# Patient Record
Sex: FEMALE | Race: WHITE
[De-identification: names, ages, dates, MRNs, and addresses within clinical notes are randomized per-mention and may not be internally consistent; named-entity substitution may affect disease eponyms.]

---

## 2018-01-06 ENCOUNTER — HOSPITAL ENCOUNTER (OUTPATIENT)
Dept: HOSPITAL 45 - C.EDB | Age: 78
Setting detail: OBSERVATION
LOS: 1 days | Discharge: HOME | End: 2018-01-07
Attending: HOSPITALIST | Admitting: HOSPITALIST
Payer: COMMERCIAL

## 2018-01-06 VITALS
TEMPERATURE: 98.6 F | OXYGEN SATURATION: 93 % | SYSTOLIC BLOOD PRESSURE: 91 MMHG | DIASTOLIC BLOOD PRESSURE: 42 MMHG | HEART RATE: 82 BPM

## 2018-01-06 VITALS
SYSTOLIC BLOOD PRESSURE: 122 MMHG | HEART RATE: 77 BPM | DIASTOLIC BLOOD PRESSURE: 76 MMHG | OXYGEN SATURATION: 97 % | TEMPERATURE: 98.24 F

## 2018-01-06 VITALS
HEIGHT: 66 IN | HEIGHT: 66 IN | BODY MASS INDEX: 23.28 KG/M2 | WEIGHT: 144.84 LBS | BODY MASS INDEX: 23.28 KG/M2 | WEIGHT: 144.84 LBS

## 2018-01-06 VITALS — SYSTOLIC BLOOD PRESSURE: 91 MMHG | DIASTOLIC BLOOD PRESSURE: 42 MMHG | TEMPERATURE: 98.6 F

## 2018-01-06 VITALS — OXYGEN SATURATION: 97 %

## 2018-01-06 DIAGNOSIS — Z90.49: ICD-10-CM

## 2018-01-06 DIAGNOSIS — E78.5: ICD-10-CM

## 2018-01-06 DIAGNOSIS — Z82.49: ICD-10-CM

## 2018-01-06 DIAGNOSIS — F03.90: ICD-10-CM

## 2018-01-06 DIAGNOSIS — Z94.7: ICD-10-CM

## 2018-01-06 DIAGNOSIS — Z83.6: ICD-10-CM

## 2018-01-06 DIAGNOSIS — Z83.3: ICD-10-CM

## 2018-01-06 DIAGNOSIS — G93.40: Primary | ICD-10-CM

## 2018-01-06 LAB
ALBUMIN SERPL-MCNC: 3.8 GM/DL (ref 3.4–5)
ALP SERPL-CCNC: 80 U/L (ref 45–117)
ALT SERPL-CCNC: 26 U/L (ref 12–78)
AST SERPL-CCNC: 23 U/L (ref 15–37)
BASOPHILS # BLD: 0.02 K/UL (ref 0–0.2)
BASOPHILS NFR BLD: 0.2 %
BUN SERPL-MCNC: 19 MG/DL (ref 7–18)
CALCIUM SERPL-MCNC: 8.5 MG/DL (ref 8.5–10.1)
CO2 SERPL-SCNC: 27 MMOL/L (ref 21–32)
CREAT SERPL-MCNC: 0.93 MG/DL (ref 0.6–1.2)
EOS ABS #: 0.04 K/UL (ref 0–0.5)
EOSINOPHIL NFR BLD AUTO: 190 K/UL (ref 130–400)
GLUCOSE SERPL-MCNC: 115 MG/DL (ref 70–99)
HCT VFR BLD CALC: 44.1 % (ref 37–47)
HGB BLD-MCNC: 15.3 G/DL (ref 12–16)
IG#: 0.03 K/UL (ref 0–0.02)
IMM GRANULOCYTES NFR BLD AUTO: 13.3 %
INR PPP: 1 (ref 0.9–1.1)
LIPASE: 193 U/L (ref 73–393)
LYMPHOCYTES # BLD: 1.09 K/UL (ref 1.2–3.4)
MCH RBC QN AUTO: 31.9 PG (ref 25–34)
MCHC RBC AUTO-ENTMCNC: 34.7 G/DL (ref 32–36)
MCV RBC AUTO: 91.9 FL (ref 80–100)
MONO ABS #: 0.37 K/UL (ref 0.11–0.59)
MONOCYTES NFR BLD: 4.5 %
NEUT ABS #: 6.66 K/UL (ref 1.4–6.5)
NEUTROPHILS # BLD AUTO: 0.5 %
NEUTROPHILS NFR BLD AUTO: 81.1 %
PMV BLD AUTO: 9.9 FL (ref 7.4–10.4)
POTASSIUM SERPL-SCNC: 4.1 MMOL/L (ref 3.5–5.1)
PROT SERPL-MCNC: 7.6 GM/DL (ref 6.4–8.2)
PTT PATIENT: 24.8 SECONDS (ref 21–31)
RED CELL DISTRIBUTION WIDTH CV: 12.4 % (ref 11.5–14.5)
RED CELL DISTRIBUTION WIDTH SD: 41.8 FL (ref 36.4–46.3)
SODIUM SERPL-SCNC: 138 MMOL/L (ref 136–145)
WBC # BLD AUTO: 8.21 K/UL (ref 4.8–10.8)

## 2018-01-06 RX ADMIN — PREDNISOLONE ACETATE SCH DROPS: 10 SUSPENSION/ DROPS OPHTHALMIC at 21:00

## 2018-01-06 NOTE — DIAGNOSTIC IMAGING REPORT
CHEST ONE VIEW PORTABLE



CLINICAL HISTORY: Weakness    



COMPARISON STUDY:  No previous studies for comparison.



FINDINGS: The cardiac and mediastinal contours are normal. There is no evidence

of focal pulmonary consolidation. There is no evidence of failure. No pleural

effusions are visualized.[ 

There is mild biapical pleural thickening





IMPRESSION: No active disease in the chest.







Electronically signed by:  Nikunj Townsend M.D.

1/6/2018 5:22 PM



Dictated Date/Time:  1/6/2018 5:22 PM

## 2018-01-06 NOTE — DIAGNOSTIC IMAGING REPORT
CT HEAD WITHOUT CONTRAST (CT)



CLINICAL HISTORY: Weakness, confusion. Possible stroke.    



COMPARISON STUDY:  No previous studies for comparison.



TECHNIQUE:  Axial CT of the brain is performed from the vertex to the skull

base. IV contrast was not administered for this examination. A dose lowering

technique was utilized adhering to the principles of ALARA.

 



CT DOSE: 623.48 mGy.cm



FINDINGS:



No intra or extra-axial mass lesions are visualized. There is no CT evidence of

acute cortical infarction. There is no evidence of midline shift. There is no

acute  hemorrhage. No calvarial fractures are visualized. 

There are patchy white matter hypodensities likely on a small vessel basis.

There is no evidence of pathologic ventricular dilatation.

There is a small right maxillary sinus air-fluid level.



IMPRESSION: 

1. No acute intracranial findings

2. Small right maxillary sinus air-fluid level. Correlate clinically in regards

to sinusitis







Electronically signed by:  Nikunj Townsend M.D.

1/6/2018 4:40 PM



Dictated Date/Time:  1/6/2018 4:39 PM

## 2018-01-06 NOTE — DIAGNOSTIC IMAGING REPORT
MR ANGIOGRAPHY OF THE [place] NO CONTRAST



CLINICAL HISTORY: Weakness. Suspected acute stroke. Confusion. Memory loss.    



COMPARISON STUDY: None.



A 3-D time-of-flight MR angiographic sequence of the [place] was

performed. Both the source and projection images were reviewed.



There is no evidence of major intracranial branch occlusion. There is no

evidence of intracranial stenosis. There are no lesions suspicious for aneurysm.



IMPRESSION: Unremarkable MR angiography of the [place].







Electronically signed by:  Nikunj Townsend M.D.

1/6/2018 9:26 PM



Dictated Date/Time:  1/6/2018 9:22 PM

## 2018-01-06 NOTE — DIAGNOSTIC IMAGING REPORT
NECK MRA



HISTORY:      Stroke



TECHNIQUE: Time-of-flight and gadolinium-enhanced MRA of the neck was performed

both before and after the intravenous administration of contrast (6.5 cc

intravenous Gadavist. All measurements were calculated based on NASCET criteria.



COMPARISON STUDY:  None.



FINDINGS: The aortic arch and proximal great vessels are widely patent.  There

is no significant stenosis, occlusion, or dissection identified within the

bilateral common carotid, internal carotid, or vertebral arteries.    



IMPRESSION:  

No significant stenosis, occlusion, or dissection identified within the carotid

or vertebral arteries. 







Electronically signed by:  Nikunj Townsend M.D.

1/6/2018 9:52 PM



Dictated Date/Time:  1/6/2018 9:50 PM

## 2018-01-06 NOTE — HISTORY AND PHYSICAL
History & Physical


Date & Time of Service:


2018 at 19:41


Chief Complaint:


Poss. Stroke


Primary Care Physician:


Óscar Serna MD


History of Present Illness


Source:  patient


78 y/o F with history of recently diagnosed hyperlipidemia and no additional 

active medical issues.  The pt was apparently driving to her hair salon when 

she became acutely confused.  She phoned the salon saying that she was going to 

be late as she could not find parking despite the parking lot being empty.  She 

then told her  that she did not know where her car was.  The hair 

dresser, no doubt an astute individual, recognized that she was not acting like 

herself, and informed her family.  She was picked up by her daughter and 

eventually brought to the ER.  The pt is awake and alert but does display 

memory impairment which her family states is an acute change from her baseline.

  She does not know the date, does not know who the president is and could not 

recall any items on 3 item recall.





Past Medical/Surgical History





Recent diagnosis of elevated cholesterol - has not been treated





Surgical Problems:


Appendectomy





Cataract surgery 





Tonsillectomy





Corneal transplant











Family History





Diabetes mellitus


Hypertension


Lung disease


Mother  owing to COPD - no history of CVA, CAD or dementia reported





Social History


Pt is normally fully independent


Smoking Status:  Never Smoker


Marital Status:  


Occupational Status:  retired





Allergies


Coded Allergies:  


     No Known Allergies (Unverified , 18)





Home Medications


Scheduled


Polymyxin/Trimethoprim Oph (Polytrim Oph), 1 DROP OPR EVERY 1 HOUR


Prednisolone Acetate 1% Oph (Pred Forte 1% Oph), 1 DROP BID





Review of Systems


Constitutional:  No fever, No chills, No sweats


Eyes:  No worsening of vision


ENT:  No hearing loss, No nasal symptoms


Respiratory:  No cough, No wheezing


Cardiovascular:  No chest pain, No orthopnea, No PND


Abdomen:  No pain, No vomiting


Musculoskeletal:  No joint pain


Genitourinary - Female:  No dysuria, No urinary frequency, No urinary urgency


Neurologic:  + memory loss, No weakness


Psychiatric:  No depression symptoms


Endocrine:  No fatigue


Hematologic / Lymphatic:  No abnormal bleeding/bruising


Integumentary:  No rash


Allergic / Immunologic:  No environmental allergies





Physical Exam


Vital Signs











  Date Time  Temp Pulse Resp B/P (MAP) Pulse Ox O2 Delivery O2 Flow Rate FiO2


 


18 18:02  83 16 125/69 94 Room Air  


 


18 16:50  89      


 


18 16:41 36.7 83 16 133/76  Room Air  


 


18 16:39     96 Room Air  








General Appearance:  + pertinent finding (PLeasant, elderly female in no 

distress)


Head:  normocephalic


Eyes:  normal inspection


ENT:  normal ENT inspection, pharynx normal


Neck:  supple, no JVD


Respiratory/Chest:  chest non-tender, lungs clear, normal breath sounds


Cardiovascular:  regular rate, rhythm, no edema, no gallop


Abdomen/GI:  normal bowel sounds, non tender, soft


Back:  normal inspection, no CVA tenderness


Extremities/Musculoskelatal:  normal inspection, no calf tenderness, normal 

capillary refill


Neurologic/Psych:  CNs II-XII nml as tested


Skin:  + pertinent finding (There are no motor or sensory deficits - 

coordination is fully intact - she is unable to recall the date or who the 

president is - she cannot recall any items on 3 item recall - she is orientd x 

2 despite this)





Diagnostics


Laboratory Results





Results Past 24 Hours








Test


  18


16:06 18


17:27 Range/Units


 


 


White Blood Count 8.21  4.8-10.8  K/uL


 


Red Blood Count 4.80  4.2-5.4  M/uL


 


Hemoglobin 15.3  12.0-16.0  g/dL


 


Hematocrit 44.1  37-47  %


 


Mean Corpuscular Volume 91.9    fL


 


Mean Corpuscular Hemoglobin 31.9  25-34  pg


 


Mean Corpuscular Hemoglobin


Concent 34.7


  


  32-36  g/dl


 


 


Platelet Count 190  130-400  K/uL


 


Mean Platelet Volume 9.9  7.4-10.4  fL


 


Neutrophils (%) (Auto) 81.1   %


 


Lymphocytes (%) (Auto) 13.3   %


 


Monocytes (%) (Auto) 4.5   %


 


Eosinophils (%) (Auto) 0.5   %


 


Basophils (%) (Auto) 0.2   %


 


Neutrophils # (Auto) 6.66  1.4-6.5  K/uL


 


Lymphocytes # (Auto) 1.09  1.2-3.4  K/uL


 


Monocytes # (Auto) 0.37  0.11-0.59  K/uL


 


Eosinophils # (Auto) 0.04  0-0.5  K/uL


 


Basophils # (Auto) 0.02  0-0.2  K/uL


 


RDW Standard Deviation 41.8  36.4-46.3  fL


 


RDW Coefficient of Variation 12.4  11.5-14.5  %


 


Immature Granulocyte % (Auto) 0.4   %


 


Immature Granulocyte # (Auto) 0.03  0.00-0.02  K/uL


 


Prothrombin Time


  10.7


  


  9.0-12.0


SECONDS


 


Prothromb Time International


Ratio 1.0


  


  0.9-1.1  


 


 


Activated Partial


Thromboplast Time 24.8


  


  21.0-31.0


SECONDS


 


Partial Thromboplastin Ratio 1.0   


 


Sodium Level 138  136-145  mmol/L


 


Potassium Level 4.1  3.5-5.1  mmol/L


 


Chloride Level 103    mmol/L


 


Carbon Dioxide Level 27  21-32  mmol/L


 


Anion Gap 8.0  3-11  mmol/L


 


Blood Urea Nitrogen 19  7-18  mg/dl


 


Creatinine


  0.93


  


  0.60-1.20


mg/dl


 


Est Creatinine Clear Calc


Drug Dose 48.1


  


   ml/min


 


 


Estimated GFR (


American) 69.2


  


   


 


 


Estimated GFR (Non-


American 59.7


  


   


 


 


BUN/Creatinine Ratio 20.0  10-20  


 


Random Glucose 115  70-99  mg/dl


 


Calcium Level 8.5  8.5-10.1  mg/dl


 


Magnesium Level 2.1  1.8-2.4  mg/dl


 


Total Bilirubin 0.5  0.2-1  mg/dl


 


Direct Bilirubin < 0.1  0-0.2  mg/dl


 


Aspartate Amino Transf


(AST/SGOT) 23


  


  15-37  U/L


 


 


Alanine Aminotransferase


(ALT/SGPT) 26


  


  12-78  U/L


 


 


Alkaline Phosphatase 80    U/L


 


Troponin I < 0.015  0-0.045  ng/ml


 


Pro-B-Type Natriuretic Peptide 140  0-1800  pg/ml


 


Total Protein 7.6  6.4-8.2  gm/dl


 


Albumin 3.8  3.4-5.0  gm/dl


 


Lipase 193    U/L


 


Thyroid Stimulating Hormone


(TSH) 1.060


  


  0.300-4.500


uIu/ml


 


Urine Color  YELLOW  


 


Urine Appearance  CLEAR CLEAR  


 


Urine pH  7.0 4.5-7.5  


 


Urine Specific Gravity  1.013 1.000-1.030  


 


Urine Protein  NEG NEG  


 


Urine Glucose (UA)  NEG NEG  


 


Urine Ketones  NEG NEG  


 


Urine Occult Blood  NEG NEG  


 


Urine Nitrite  NEG NEG  


 


Urine Bilirubin  NEG NEG  


 


Urine Urobilinogen  NEG NEG  


 


Urine Leukocyte Esterase  NEG NEG  








Microbiology Results


18 Urine Culture, Received


         Pending





Diagnostic Radiology


CT head


1. No acute intracranial findings


2. Small right maxillary sinus air-fluid level. Correlate clinically in regards 

to sinusitis


Normal EKG





Impression


Assessment and Plan


78 y/o F with history of recently diagnosed hyperlipidemia and no additional 

active medical issues.  The pt was apparently driving to her hair salon when 

she became acutely confused.  She phoned the salon saying that she was going to 

be late as she could not find parking despite the parking lot being empty.  She 

then told her  that she did not know where her car was.  The hair 

dresser, no doubt an astute individual, recognized that she was not acting like 

herself, and informed her family.  She was picked up by her daughter and 

eventually brought to the ER.  The pt is awake and alert but does display 

memory impairment which her family states is an acute change from her baseline.

  She does not know the date, does not know who the president is and could not 

recall any items on 3 item recall.





The pt has persistent memory impairment - the exam would be consistent with 

dementia, however, the family are adamant that her memory loss is acute.  She 

is admitted with a TIA protocol.  An MRI/MRA will be obtained and we will 

consult neurology.  She will be placed on ASA and a statin.








Full code - Heparin prophylaxis 


Total time for this admit including review of labs, meds, imaging - discussion 

with pt, family and ER attending - 37 min





Level of Care


Telemetry





Resuscitation Status


FULL RESUSCITATION





VTE Prophylaxis


VTE Risk Assessment Done? Y/N:  Yes


Risk Level:  Low


Given or contraindicated:  Unfractionated heparin SQ

## 2018-01-06 NOTE — EMERGENCY ROOM VISIT NOTE
History


Report prepared by Maryjane:  Jackson Flores


Under the Supervision of:  Dr. Hi Trujillo M.D.


First contact with patient:  16:21


Stated Complaint:  POSS. STROKE





History of Present Illness


The patient is a 77 year old white female with a past medical history of right 

corneal transplant and cataracts bilaterally who presents to the ED brought in 

by EMS with episodic altered mental status 45 minutes PTA. Positive 

forgetfulness and leg cramps. Negative chest pain, shortness of breath, or 

urinary symptoms. The patient states that she was at the hair salon when she 

experienced forgetfulness. Per family, she called the [employer] to inform them 

that she was late due to lack of parking and she parked half a block away; 

however, there the parking lot was empty. Per family, the patient is normally 

coherent. Per EMS, the [place] stroke evaluation was negative. Per EMS, the 

patient's blood sugar was normal and her vital signs were normal. Per EMS, the 

patient was protecting her airway. Per EMS, the patient did not have obvious 

focal neurological deficits. She denies any other underlying medical problems. 

She denies any history of tobacco or alcohol use.





   Source of History:  patient, family, EMS


   Onset:  45 minutes PTA


   Position:  other (global )


   Quality:  other (altered mental status)


   Timing:  other (episodic)


   Associated Symptoms:  No chest pain, No SOB, No urinary symptoms


Note:


She notes forgetfulness and leg cramps.





Review of Systems


See HPI for pertinent positives and negatives.  A total of ten systems were 

reviewed and were otherwise negative.





Past Medical & Surgical


Surgical Problems:


(1) Hx of appendectomy


(2) Hx of cataract surgery


(3) Hx of tonsillectomy








Family History





Diabetes mellitus


Hypertension


Lung disease





Social History


Smoking Status:  Never Smoker


Alcohol Use:  none


Marital Status:  


Housing Status:  lives with family


Occupation Status:  retired





Current/Historical Medications


Scheduled


Polymyxin/Trimethoprim Oph (Polytrim Oph), 1 DROP OPR EVERY 1 HOUR


Prednisolone Acetate 1% Oph (Pred Forte 1% Oph), 1 DROP BID





Allergies


Coded Allergies:  


     No Known Allergies (Unverified , 1/6/18)





Physical Exam


Vital Signs











  Date Time  Temp Pulse Resp B/P (MAP) Pulse Ox O2 Delivery O2 Flow Rate FiO2


 


1/6/18 20:02 36.7 81 16 137/81 97   


 


1/6/18 19:38  81 16 137/81 97 Room Air  


 


1/6/18 18:02  83 16 125/69 94 Room Air  


 


1/6/18 16:50  89      


 


1/6/18 16:41 36.7 83 16 133/76  Room Air  


 


1/6/18 16:39     96 Room Air  











Physical Exam


GENERAL: Awake, alert, well-appearing, NAD


HENT: Normocephalic, atraumatic.


EYES: Normal conjunctiva. Sclera non-icteric.


NECK: Supple. No nuchal rigidity. FROM.


RESPIRATORY: CTAB, no rhonchi, wheezing, crackles


CARDIAC: RRR, no MRG


ABDOMEN: Soft, NTND, BS+


MSK: No chest wall TTP, no LE edema


NEURO: CN 2-12 intact, 5/5 upper and lower extremity strength, no dysmetria, no 

drift, good finger to nose, no sensory deficits. 


SKIN: No rash or jaundice noted.





Medical Decision & Procedures


ER Provider


Diagnostic Interpretation:


Radiology results as stated below per my review and radiologist interpretation: 








CT HEAD WITHOUT CONTRAST (CT)





CLINICAL HISTORY: Weakness, confusion. Possible stroke.    





COMPARISON STUDY:  No previous studies for comparison.





TECHNIQUE:  Axial CT of the brain is performed from the vertex to the skull


base. IV contrast was not administered for this examination. A dose lowering


technique was utilized adhering to the principles of ALARA.


 





CT DOSE: 623.48 mGy.cm





FINDINGS:





No intra or extra-axial mass lesions are visualized. There is no CT evidence of


acute cortical infarction. There is no evidence of midline shift. There is no


acute  hemorrhage. No calvarial fractures are visualized. 


There are patchy white matter hypodensities likely on a small vessel basis.


There is no evidence of pathologic ventricular dilatation.


There is a small right maxillary sinus air-fluid level.





IMPRESSION: 


1. No acute intracranial findings


2. Small right maxillary sinus air-fluid level. Correlate clinically in regards


to sinusitis











Electronically signed by:  Nikunj Townsend M.D.


1/6/2018 4:40 PM





Dictated Date/Time:  1/6/2018 4:39 PM





Laboratory Results


1/6/18 16:06








Red Blood Count 4.80, Mean Corpuscular Volume 91.9, Mean Corpuscular Hemoglobin 

31.9, Mean Corpuscular Hemoglobin Concent 34.7, Mean Platelet Volume 9.9, 

Neutrophils (%) (Auto) 81.1, Lymphocytes (%) (Auto) 13.3, Monocytes (%) (Auto) 

4.5, Eosinophils (%) (Auto) 0.5, Basophils (%) (Auto) 0.2, Neutrophils # (Auto) 

6.66, Lymphocytes # (Auto) 1.09, Monocytes # (Auto) 0.37, Eosinophils # (Auto) 

0.04, Basophils # (Auto) 0.02





1/6/18 16:06

















Test


  1/6/18


16:06 1/6/18


17:27


 


White Blood Count


  8.21 K/uL


(4.8-10.8) 


 


 


Red Blood Count


  4.80 M/uL


(4.2-5.4) 


 


 


Hemoglobin


  15.3 g/dL


(12.0-16.0) 


 


 


Hematocrit 44.1 % (37-47)  


 


Mean Corpuscular Volume


  91.9 fL


() 


 


 


Mean Corpuscular Hemoglobin


  31.9 pg


(25-34) 


 


 


Mean Corpuscular Hemoglobin


Concent 34.7 g/dl


(32-36) 


 


 


Platelet Count


  190 K/uL


(130-400) 


 


 


Mean Platelet Volume


  9.9 fL


(7.4-10.4) 


 


 


Neutrophils (%) (Auto) 81.1 %  


 


Lymphocytes (%) (Auto) 13.3 %  


 


Monocytes (%) (Auto) 4.5 %  


 


Eosinophils (%) (Auto) 0.5 %  


 


Basophils (%) (Auto) 0.2 %  


 


Neutrophils # (Auto)


  6.66 K/uL


(1.4-6.5) 


 


 


Lymphocytes # (Auto)


  1.09 K/uL


(1.2-3.4) 


 


 


Monocytes # (Auto)


  0.37 K/uL


(0.11-0.59) 


 


 


Eosinophils # (Auto)


  0.04 K/uL


(0-0.5) 


 


 


Basophils # (Auto)


  0.02 K/uL


(0-0.2) 


 


 


RDW Standard Deviation


  41.8 fL


(36.4-46.3) 


 


 


RDW Coefficient of Variation


  12.4 %


(11.5-14.5) 


 


 


Immature Granulocyte % (Auto) 0.4 %  


 


Immature Granulocyte # (Auto)


  0.03 K/uL


(0.00-0.02) 


 


 


Prothrombin Time


  10.7 SECONDS


(9.0-12.0) 


 


 


Prothromb Time International


Ratio 1.0 (0.9-1.1) 


  


 


 


Activated Partial


Thromboplast Time 24.8 SECONDS


(21.0-31.0) 


 


 


Partial Thromboplastin Ratio 1.0  


 


Anion Gap


  8.0 mmol/L


(3-11) 


 


 


Est Creatinine Clear Calc


Drug Dose 48.1 ml/min 


  


 


 


Estimated GFR (


American) 69.2 


  


 


 


Estimated GFR (Non-


American 59.7 


  


 


 


BUN/Creatinine Ratio 20.0 (10-20)  


 


Calcium Level


  8.5 mg/dl


(8.5-10.1) 


 


 


Magnesium Level


  2.1 mg/dl


(1.8-2.4) 


 


 


Total Bilirubin


  0.5 mg/dl


(0.2-1) 


 


 


Direct Bilirubin


  < 0.1 mg/dl


(0-0.2) 


 


 


Aspartate Amino Transf


(AST/SGOT) 23 U/L (15-37) 


  


 


 


Alanine Aminotransferase


(ALT/SGPT) 26 U/L (12-78) 


  


 


 


Alkaline Phosphatase


  80 U/L


() 


 


 


Troponin I


  < 0.015 ng/ml


(0-0.045) 


 


 


Pro-B-Type Natriuretic Peptide


  140 pg/ml


(0-1800) 


 


 


Total Protein


  7.6 gm/dl


(6.4-8.2) 


 


 


Albumin


  3.8 gm/dl


(3.4-5.0) 


 


 


Lipase


  193 U/L


() 


 


 


Thyroid Stimulating Hormone


(TSH) 1.060 uIu/ml


(0.300-4.500) 


 


 


Urine Color  YELLOW 


 


Urine Appearance  CLEAR (CLEAR) 


 


Urine pH  7.0 (4.5-7.5) 


 


Urine Specific Gravity


  


  1.013


(1.000-1.030)


 


Urine Protein  NEG (NEG) 


 


Urine Glucose (UA)  NEG (NEG) 


 


Urine Ketones  NEG (NEG) 


 


Urine Occult Blood  NEG (NEG) 


 


Urine Nitrite  NEG (NEG) 


 


Urine Bilirubin  NEG (NEG) 


 


Urine Urobilinogen  NEG (NEG) 


 


Urine Leukocyte Esterase  NEG (NEG) 





Laboratory results reviewed by me





Medications Administered











 Medications


  (Trade)  Dose


 Ordered  Sig/Davis


 Route  Start Time


 Stop Time Status Last Admin


Dose Admin


 


 Aspirin


  (Aspirin Chew)  324 mg  NOW  STAT


 PO  1/6/18 17:43


 1/6/18 17:59 DC 1/6/18 18:02


324 MG











ECG


Indication:  altered mental status


Rate (beats per minute):  76


Rhythm:  sinus rhythm


Findings:  nonspecific-ST abn, T-wave inversion (aVL), other (Normal axis. 

Short CA. No other STS changes or TWI.)





ED Course


1635: The patient was evaluated in room B2. A complete history and physical 

exam was performed.





1748: I reassessed the patient at this time. She is resting comfortably. The 

family states the patient is still confused.





Medical Decision


Prior records/ancillary studies reviewed and summarized above.


Nursing notes reviewed.


Additional history obtained from EMS and family.





The patient's history was concerning for altered mental status.





Differential diagnosis:


Etiologies such as metabolic, infection, hypoglycemia, electrolyte abnormalities

, cardiac sources, intracerebral event, toxicologic, neurologic, as well as 

others were entertained.





Patient was seen and evaluated at the bedside.  I did receive a medical command 

call us the patient did have some concern for possible confusion, altered 

mental status, or stroke.  Patient approximate 30-40 minutes PTA developed some 

repetitive questioning and confusion.  [facility] glucose was normal on scene.  

Patient did go straight to CT scan.  Upon presentation the patient was very well

-appearing and O 3 with a GCS of 15.  Patient did not have any focal either 

subjective or objective neurologic deficits.  Patient did have blood work, EKG, 

troponin, CT brain, and chest x-ray completed.  Patient's chest x-ray negative 

acute.  CT brain negative acute.  Patient's EKG nonischemic however, single 

isolated T wave inversion in aVL as well as short CA but no other acute 

concerns.  Patient's blood work was fairly unremarkable.  Patient had normal 

glucose.  Patient had normal kidney function.  Patient had no severe 

electrolyte derangements.  Patient had a normal white blood cell count and H&H.

  Given the patient's ongoing confusion there was questionable concern for TIA 

or possible initial stroke that would require additional imaging. I did discuss 

this could be seizure. However, patient w/o seizure like activity, trauma, 

tongue biting, incontinence, or headache.  I did speak with the hospitalist who 

agreed to further evaluate and treat the patient.  Patient was given a full 

dose aspirin.





Medication Reconcilliation


Current Medication List:  was personally reviewed by me





Blood Pressure Screening


Patient's blood pressure:  Normal blood pressure





Impression





 Primary Impression:  


 Confusion





Scribe Attestation


The scribe's documentation has been prepared under my direction and personally 

reviewed by me in its entirety. I confirm that the note above accurately 

reflects all work, treatment, procedures, and medical decision making performed 

by me.





Departure Information


Dispostion


Being Evaluated By Hospitalist





Óscar Saul MD (PCP)

## 2018-01-06 NOTE — DIAGNOSTIC IMAGING REPORT
MRI OF THE BRAIN WITHOUT  CONTRAST



CLINICAL HISTORY: Stroke CONFUSION, MEMORY LOSS.



COMPARISON STUDY: Noncontrast head CT dated 1/6/2018



FINDINGS:

Sagittal T1, axial diffusion, proton density and T2 weighted axial, coronal

FLAIR, and axial T1-weighted images were acquired. 

No intra or extra-axial mass lesions are visualized

Axial diffusion-weighted images reveal no evidence of acute or subacute

infarction.

There is no evidence of ventricular dilatation.

Proton density T2-weighted and FLAIR images reveal scattered foci of increased

T2 signal within the white matter, likely on a small vessel basis.

There are no abnormal flow voids.

There are mild inflammatory changes within the maxillary sinuses



IMPRESSION:  

1. No evidence of acute or subacute infarction

2. No intracranial masses identified in this noncontrast study

3. Scattered foci of increased T2 signal within the white matter, likely on a

small vessel ischemic basis







Electronically signed by:  Nikunj Townsend M.D.

1/6/2018 9:35 PM



Dictated Date/Time:  1/6/2018 9:33 PM

## 2018-01-07 VITALS
SYSTOLIC BLOOD PRESSURE: 119 MMHG | OXYGEN SATURATION: 95 % | TEMPERATURE: 98.42 F | HEART RATE: 80 BPM | DIASTOLIC BLOOD PRESSURE: 63 MMHG

## 2018-01-07 VITALS
DIASTOLIC BLOOD PRESSURE: 71 MMHG | TEMPERATURE: 98.6 F | SYSTOLIC BLOOD PRESSURE: 121 MMHG | HEART RATE: 82 BPM | OXYGEN SATURATION: 96 %

## 2018-01-07 VITALS
HEART RATE: 80 BPM | OXYGEN SATURATION: 95 % | TEMPERATURE: 98.42 F | DIASTOLIC BLOOD PRESSURE: 63 MMHG | SYSTOLIC BLOOD PRESSURE: 119 MMHG

## 2018-01-07 VITALS
TEMPERATURE: 98.24 F | SYSTOLIC BLOOD PRESSURE: 117 MMHG | DIASTOLIC BLOOD PRESSURE: 72 MMHG | OXYGEN SATURATION: 95 % | HEART RATE: 74 BPM

## 2018-01-07 LAB
KETONES UR QL STRIP: 98 MG/DL
PH UR: 183 MG/DL (ref 0–200)

## 2018-01-07 RX ADMIN — PREDNISOLONE ACETATE SCH DROPS: 10 SUSPENSION/ DROPS OPHTHALMIC at 09:00

## 2018-01-07 NOTE — DISCHARGE INSTRUCTIONS
Discharge Instructions


Date of Service


Jan 7, 2018.





Admission


Reason for Admission:  Confusion





Discharge


Discharge Diagnosis / Problem:  Encephalopathy, dementia





Discharge Goals


Goal(s):  Decrease discomfort, Improve function, Increase independence, Improve 

disease control, Improve nutritional status, Learn about illness, Diagnostic 

testing, Therapeutic intervention, Prevent Disease Progression





Activity Recommendations


Activity Limitations:  resume your previous activity


Exercise/Sports Limitations:  as tolerated





.





Instructions / Follow-Up


Instructions / Follow-Up


Patient to be discharged home


Likely patient has beginning symptoms of dementia


No stroke or lesions found on imaging studies


Patient stable for discharge


No changes in medications made


Follow up with Dr Serna in 1-2 weeks





Current Hospital Diet


Patient's current hospital diet: Regular Diet





Discharge Diet


Recommended Diet:  Regular Diet





Pending Studies


Studies pending at discharge:  no





Laboratory Results





Hemoglobin A1c








Test


  1/6/18


16:06 Range/Units


 








Lipid Panel








Test


  1/7/18


06:59 Range/Units


 


 


Triglycerides Level 59  0-150  mg/dl


 


Cholesterol Level 183  0-200  mg/dl


 


HDL Cholesterol 73   mg/dl


 


Cholesterol/HDL Ratio 2.5   


 


LDL Cholesterol, Calculated 98   mg/dl











Medical Emergencies








.


Who to Call and When:





Medical Emergencies:  If at any time you feel your situation is an emergency, 

please call 911 immediately.





.





Non-Emergent Contact


Non-Emergency issues call your:  Primary Care Provider


Call Non-Emergent contact if:  you have any medication questions





.


.








"Provider Documentation" section prepared by Wesly Last.








.





VTE Core Measure


Inpt VTE Proph given/why not?:  Unfractionated heparin SQ

## 2018-01-07 NOTE — DISCHARGE SUMMARY
Discharge Summary


Date of Service


Jan 7, 2018.





Discharge Summary


Admission Date:


Jan 6, 2018 at 19:33


Discharge Date:  Jan 7, 2018


Discharge Disposition:  Home


Principal Diagnosis:  Acute delirium





Medication Reconciliation


Continued Medications:  


Polymyxin/Trimethoprim Oph (Polytrim Oph)  Soln


1 DROP OPR EVERY 1 HOUR





Prednisolone Acetate 1% Oph (Pred Forte 1% Oph)  Susp


1 DROP BID











Discharge Exam


Review of Systems:  


   Constitutional:  No fever, No chills, No sweats, No weakness


   Respiratory:  No cough, No sputum, No wheezing, No shortness of breath


   Cardiovascular:  No chest pain, No orthopnea, No PND, No edema


   Abdomen:  No pain, No nausea, No vomiting, No diarrhea


   Musculoskeletal:  No joint pain, No muscle pain, No swelling, No calf pain


   Genitourinary - Female:  No dysuria, No urinary frequency, No urinary urgency

, No urinary incontinence


   Neurologic:  No memory loss, No paralysis, No weakness, No numbness/tingling


   Psychiatric:  No depression symptoms, No anhedonism, No anxiety, No insomnia


   Endocrine:  No fatigue, No excessive thirst


   Integumentary:  No rash, No itch


Physical Exam:  


   General Appearance:  WD/WN, no apparent distress


   Eyes:  normal inspection, PERRL, EOMI, sclerae normal


   Neck:  supple, no adenopathy, thyroid normal, no JVD


   Respiratory/Chest:  chest non-tender, lungs clear, normal breath sounds, no 

respiratory distress


   Cardiovascular:  regular rate, rhythm, no edema, no gallop, no JVD


   Abdomen / GI:  normal bowel sounds, non tender, soft, no organomegaly


   Neurologic/Psychiatric:  no motor/sensory deficits, alert, normal mood/affect

, oriented x 3


   Skin:  normal color, warm/dry, no rash


   Lymphatic:  no adenopathy





Hospital Course


76 y/o F with history of recently diagnosed hyperlipidemia and no additional 

active medical issues.  The pt was apparently driving to her hair salon when 

she became acutely confused.  She phoned the salon saying that she was going to 

be late as she could not find parking despite the parking lot being empty.  She 

then told her  that she did not know where her car was.  The hair 

dresser, no doubt an astute individual, recognized that she was not acting like 

herself, and informed her family.  She was picked up by her daughter and 

eventually brought to the ER.  The pt is awake and alert but does display 

memory impairment which her family states is an acute change from her baseline.

  She does not know the date, does not know who the president is and could not 

recall any items on 3 item recall.





The pt has persistent memory impairment - the exam would be most  consistent 

with dementia, however, the family are adamant that her memory loss is acute.  

Brain/neck MRI/MRA unremarkable. No focal defecits noted. Pt alert and oriented 

x 3 the following day. Laboratory data unremarkable, TSH, B12, lymes 

unremarkable. Discharge to home





Full code - Heparin prophylaxis


Total Time Spent:  Greater than 30 minutes


This includes examination of the patient, discharge planning, medication 

reconciliation, and communication with other providers.





Discharge Instructions


Please refer to the electronic Patient Visit Report (Discharge Instructions) 

for additional information.





Additional Copies To


Óscar Serna MD

## 2018-01-08 LAB — HBA1C MFR BLD: 5.7 % (ref 4.5–5.6)

## 2018-01-23 NOTE — EDITING REQUIRED CODING QUERY
TIA CLARIFICATION 





Please clarify below the presence of the TIA during this admission :





(  ) TIA was present during this admission 





(  X) TIA was ruled out during this admission 





(  ) Other, please clarify:









Thank you for your assistance,

Mckayla Khoury -